# Patient Record
Sex: FEMALE | Race: WHITE | Employment: OTHER | URBAN - METROPOLITAN AREA
[De-identification: names, ages, dates, MRNs, and addresses within clinical notes are randomized per-mention and may not be internally consistent; named-entity substitution may affect disease eponyms.]

---

## 2017-04-17 ENCOUNTER — HOSPITAL ENCOUNTER (OUTPATIENT)
Dept: GENERAL RADIOLOGY | Age: 67
Discharge: HOME | End: 2017-04-17
Attending: INTERNAL MEDICINE | Admitting: INTERNAL MEDICINE

## 2017-04-17 ENCOUNTER — OFFICE VISIT (OUTPATIENT)
Age: 67
End: 2017-04-17

## 2017-04-17 VITALS
BODY MASS INDEX: 21.3 KG/M2 | SYSTOLIC BLOOD PRESSURE: 122 MMHG | HEIGHT: 63 IN | DIASTOLIC BLOOD PRESSURE: 68 MMHG | WEIGHT: 120.2 LBS

## 2017-04-17 DIAGNOSIS — M81.0 OSTEOPOROSIS, POSTMENOPAUSAL: Primary | ICD-10-CM

## 2017-04-17 DIAGNOSIS — E55.9 VITAMIN D DEFICIENCY: ICD-10-CM

## 2017-04-17 DIAGNOSIS — M81.0 OSTEOPOROSIS, POSTMENOPAUSAL: ICD-10-CM

## 2017-04-17 DIAGNOSIS — Z51.81 MEDICATION MONITORING ENCOUNTER: ICD-10-CM

## 2017-04-17 PROCEDURE — 99214 OFFICE O/P EST MOD 30 MIN: CPT | Performed by: INTERNAL MEDICINE

## 2017-04-17 PROCEDURE — 77080 DXA BONE DENSITY AXIAL: CPT | Performed by: INTERNAL MEDICINE

## 2017-04-21 ENCOUNTER — TELEPHONE (OUTPATIENT)
Age: 67
End: 2017-04-21

## 2017-04-24 ENCOUNTER — TELEPHONE (OUTPATIENT)
Dept: ENDOCRINOLOGY | Age: 67
End: 2017-04-24

## 2017-04-25 RX ORDER — ALENDRONATE SODIUM 70 MG/1
70 TABLET ORAL WEEKLY
Qty: 12 TABLET | Refills: 4 | Status: SHIPPED | OUTPATIENT
Start: 2017-04-25 | End: 2018-11-28

## 2017-04-28 ENCOUNTER — PROCEDURE VISIT (OUTPATIENT)
Age: 67
End: 2017-04-28

## 2017-04-28 DIAGNOSIS — M81.0 OSTEOPOROSIS, POSTMENOPAUSAL: Primary | ICD-10-CM

## 2017-08-28 ENCOUNTER — HOSPITAL ENCOUNTER (OUTPATIENT)
Dept: ENDOSCOPY | Age: 67
Discharge: HOME | End: 2017-08-28
Attending: INTERNAL MEDICINE | Admitting: INTERNAL MEDICINE

## 2017-08-28 VITALS
HEIGHT: 63 IN | DIASTOLIC BLOOD PRESSURE: 82 MMHG | BODY MASS INDEX: 21.26 KG/M2 | RESPIRATION RATE: 16 BRPM | WEIGHT: 120 LBS | OXYGEN SATURATION: 99 % | HEART RATE: 90 BPM | TEMPERATURE: 97.1 F | SYSTOLIC BLOOD PRESSURE: 146 MMHG

## 2017-08-28 ASSESSMENT — PAIN - FUNCTIONAL ASSESSMENT: PAIN_FUNCTIONAL_ASSESSMENT: 0-10

## 2018-05-01 ENCOUNTER — OFFICE VISIT (OUTPATIENT)
Age: 68
End: 2018-05-01

## 2018-05-01 ENCOUNTER — HOSPITAL ENCOUNTER (OUTPATIENT)
Dept: GENERAL RADIOLOGY | Age: 68
Discharge: HOME | End: 2018-05-01
Attending: INTERNAL MEDICINE | Admitting: INTERNAL MEDICINE

## 2018-05-01 ENCOUNTER — PROCEDURE VISIT (OUTPATIENT)
Age: 68
End: 2018-05-01

## 2018-05-01 VITALS
WEIGHT: 120 LBS | SYSTOLIC BLOOD PRESSURE: 132 MMHG | DIASTOLIC BLOOD PRESSURE: 76 MMHG | BODY MASS INDEX: 21.26 KG/M2 | HEIGHT: 63 IN

## 2018-05-01 DIAGNOSIS — M81.0 OSTEOPOROSIS, POSTMENOPAUSAL: Primary | ICD-10-CM

## 2018-05-01 DIAGNOSIS — M81.0 OSTEOPOROSIS, POSTMENOPAUSAL: ICD-10-CM

## 2018-05-01 DIAGNOSIS — Z51.81 MEDICATION MONITORING ENCOUNTER: ICD-10-CM

## 2018-05-01 DIAGNOSIS — E55.9 VITAMIN D DEFICIENCY: ICD-10-CM

## 2018-05-01 PROCEDURE — 1123F ACP DISCUSS/DSCN MKR DOCD: CPT | Performed by: INTERNAL MEDICINE

## 2018-05-01 PROCEDURE — 77080 DXA BONE DENSITY AXIAL: CPT | Performed by: INTERNAL MEDICINE

## 2018-05-01 PROCEDURE — 1036F TOBACCO NON-USER: CPT | Performed by: INTERNAL MEDICINE

## 2018-05-01 PROCEDURE — 99214 OFFICE O/P EST MOD 30 MIN: CPT | Performed by: INTERNAL MEDICINE

## 2018-05-01 PROCEDURE — 4040F PNEUMOC VAC/ADMIN/RCVD: CPT | Performed by: INTERNAL MEDICINE

## 2018-05-01 PROCEDURE — 1090F PRES/ABSN URINE INCON ASSESS: CPT | Performed by: INTERNAL MEDICINE

## 2018-05-01 PROCEDURE — G8427 DOCREV CUR MEDS BY ELIG CLIN: HCPCS | Performed by: INTERNAL MEDICINE

## 2018-05-01 PROCEDURE — G8399 PT W/DXA RESULTS DOCUMENT: HCPCS | Performed by: INTERNAL MEDICINE

## 2018-05-01 PROCEDURE — G8420 CALC BMI NORM PARAMETERS: HCPCS | Performed by: INTERNAL MEDICINE

## 2018-05-01 PROCEDURE — 3017F COLORECTAL CA SCREEN DOC REV: CPT | Performed by: INTERNAL MEDICINE

## 2018-05-03 ENCOUNTER — TELEPHONE (OUTPATIENT)
Age: 68
End: 2018-05-03

## 2018-05-04 DIAGNOSIS — M81.0 OSTEOPOROSIS, POSTMENOPAUSAL: Primary | ICD-10-CM

## 2018-05-23 ENCOUNTER — NURSE ONLY (OUTPATIENT)
Age: 68
End: 2018-05-23

## 2018-05-23 DIAGNOSIS — M81.0 OSTEOPOROSIS, POSTMENOPAUSAL: Primary | ICD-10-CM

## 2018-05-23 PROCEDURE — 99999 PR OFFICE/OUTPT VISIT,PROCEDURE ONLY: CPT | Performed by: INTERNAL MEDICINE

## 2018-05-23 PROCEDURE — 96372 THER/PROPH/DIAG INJ SC/IM: CPT | Performed by: INTERNAL MEDICINE

## 2018-09-24 ENCOUNTER — APPOINTMENT (RX ONLY)
Dept: URBAN - METROPOLITAN AREA CLINIC 170 | Facility: CLINIC | Age: 68
Setting detail: DERMATOLOGY
End: 2018-09-24

## 2018-09-24 DIAGNOSIS — D22 MELANOCYTIC NEVI: ICD-10-CM

## 2018-09-24 DIAGNOSIS — L91.8 OTHER HYPERTROPHIC DISORDERS OF THE SKIN: ICD-10-CM

## 2018-09-24 DIAGNOSIS — L82.0 INFLAMED SEBORRHEIC KERATOSIS: ICD-10-CM

## 2018-09-24 DIAGNOSIS — L82.1 OTHER SEBORRHEIC KERATOSIS: ICD-10-CM

## 2018-09-24 DIAGNOSIS — L81.4 OTHER MELANIN HYPERPIGMENTATION: ICD-10-CM

## 2018-09-24 DIAGNOSIS — D18.0 HEMANGIOMA: ICD-10-CM

## 2018-09-24 PROBLEM — L23.7 ALLERGIC CONTACT DERMATITIS DUE TO PLANTS, EXCEPT FOOD: Status: ACTIVE | Noted: 2018-09-24

## 2018-09-24 PROBLEM — D22.5 MELANOCYTIC NEVI OF TRUNK: Status: ACTIVE | Noted: 2018-09-24

## 2018-09-24 PROBLEM — D18.01 HEMANGIOMA OF SKIN AND SUBCUTANEOUS TISSUE: Status: ACTIVE | Noted: 2018-09-24

## 2018-09-24 PROCEDURE — 17110 DESTRUCTION B9 LES UP TO 14: CPT

## 2018-09-24 PROCEDURE — ? BENIGN DESTRUCTION

## 2018-09-24 PROCEDURE — ? COUNSELING

## 2018-09-24 PROCEDURE — ? SUNSCREEN RECOMMENDATIONS

## 2018-09-24 PROCEDURE — 99213 OFFICE O/P EST LOW 20 MIN: CPT | Mod: 25

## 2018-09-24 ASSESSMENT — LOCATION DETAILED DESCRIPTION DERM
LOCATION DETAILED: LEFT RIB CAGE
LOCATION DETAILED: LEFT LATERAL CANTHUS
LOCATION DETAILED: RIGHT ANTERIOR SHOULDER
LOCATION DETAILED: RIGHT INFERIOR MEDIAL MIDBACK
LOCATION DETAILED: INFERIOR THORACIC SPINE
LOCATION DETAILED: EPIGASTRIC SKIN

## 2018-09-24 ASSESSMENT — LOCATION SIMPLE DESCRIPTION DERM
LOCATION SIMPLE: RIGHT SHOULDER
LOCATION SIMPLE: UPPER BACK
LOCATION SIMPLE: ABDOMEN
LOCATION SIMPLE: RIGHT LOWER BACK
LOCATION SIMPLE: LEFT EYELID

## 2018-09-24 ASSESSMENT — LOCATION ZONE DERM
LOCATION ZONE: TRUNK
LOCATION ZONE: ARM
LOCATION ZONE: EYELID

## 2018-09-24 NOTE — PROCEDURE: BENIGN DESTRUCTION
Include Z78.9 (Other Specified Conditions Influencing Health Status) As An Associated Diagnosis?: No
Anesthesia Volume In Cc: 0.5
Post-Care Instructions: I reviewed with the patient in detail post-care instructions. Patient is to wear sunprotection, and avoid picking at any of the treated lesions. Pt may apply Vaseline to crusted or scabbing areas.
Consent: The patient's consent was obtained including but not limited to risks of crusting, scabbing, blistering, scarring, darker or lighter pigmentary change, recurrence, incomplete removal and infection.
Medical Necessity Clause: This procedure was medically necessary because the lesions that were treated were:
Medical Necessity Information: It is in your best interest to select a reason for this procedure from the list below. All of these items fulfill various CMS LCD requirements except the new and changing color options.
Detail Level: Detailed
Treatment Number (Will Not Render If 0): 0

## 2018-09-24 NOTE — PROCEDURE: SUNSCREEN RECOMMENDATIONS
General Sunscreen Counseling: I recommended a broad spectrum sunscreen with a SPF of 50 or higher. Sunscreens should be applied at least 15 minutes prior to expected sun exposure and then every 2 hours after that as long as sun exposure continues. If swimming or exercising, sunscreen should be reapplied every 45 minutes to an hour after getting wet or sweating.  One ounce, or the equivalent of a shot glass full of sunscreen, is adequate to protect the skin not covered by a bathing suit. I also recommended a lip balm with a sunscreen as well. Sun protective clothing can be used in lieu of sunscreen but must be worn the entire time you are exposed to the sun's rays.
Detail Level: Generalized

## 2018-11-27 DIAGNOSIS — M81.0 OSTEOPOROSIS, POSTMENOPAUSAL: Primary | ICD-10-CM

## 2018-11-29 ENCOUNTER — NURSE ONLY (OUTPATIENT)
Dept: ENDOCRINOLOGY | Age: 68
End: 2018-11-29
Payer: MEDICARE

## 2018-11-29 DIAGNOSIS — M81.0 OSTEOPOROSIS, POSTMENOPAUSAL: Primary | ICD-10-CM

## 2018-11-29 PROCEDURE — 96372 THER/PROPH/DIAG INJ SC/IM: CPT | Performed by: INTERNAL MEDICINE

## 2019-05-20 ENCOUNTER — RX ONLY (OUTPATIENT)
Age: 69
Setting detail: RX ONLY
End: 2019-05-20

## 2019-05-20 RX ORDER — TRIAMCINOLONE ACETONIDE 1 MG/G
CREAM TOPICAL
Qty: 1 | Refills: 0 | Status: ERX | COMMUNITY
Start: 2019-05-20

## 2019-06-25 ENCOUNTER — PROCEDURE VISIT (OUTPATIENT)
Dept: ENDOCRINOLOGY | Age: 69
End: 2019-06-25
Payer: MEDICARE

## 2019-06-25 ENCOUNTER — OFFICE VISIT (OUTPATIENT)
Dept: ENDOCRINOLOGY | Age: 69
End: 2019-06-25
Payer: MEDICARE

## 2019-06-25 ENCOUNTER — HOSPITAL ENCOUNTER (OUTPATIENT)
Dept: GENERAL RADIOLOGY | Age: 69
Discharge: HOME | End: 2019-06-25
Payer: MEDICARE

## 2019-06-25 VITALS
DIASTOLIC BLOOD PRESSURE: 68 MMHG | BODY MASS INDEX: 21.44 KG/M2 | HEIGHT: 63 IN | SYSTOLIC BLOOD PRESSURE: 121 MMHG | WEIGHT: 121 LBS

## 2019-06-25 DIAGNOSIS — M81.0 OSTEOPOROSIS, POSTMENOPAUSAL: ICD-10-CM

## 2019-06-25 DIAGNOSIS — Z51.81 MEDICATION MONITORING ENCOUNTER: ICD-10-CM

## 2019-06-25 DIAGNOSIS — E55.9 VITAMIN D DEFICIENCY: ICD-10-CM

## 2019-06-25 DIAGNOSIS — M81.0 OSTEOPOROSIS, POSTMENOPAUSAL: Primary | ICD-10-CM

## 2019-06-25 LAB — VITAMIN D 25-HYDROXY: 34.2 NG/ML

## 2019-06-25 PROCEDURE — 4040F PNEUMOC VAC/ADMIN/RCVD: CPT | Performed by: INTERNAL MEDICINE

## 2019-06-25 PROCEDURE — G8399 PT W/DXA RESULTS DOCUMENT: HCPCS | Performed by: INTERNAL MEDICINE

## 2019-06-25 PROCEDURE — 3017F COLORECTAL CA SCREEN DOC REV: CPT | Performed by: INTERNAL MEDICINE

## 2019-06-25 PROCEDURE — 77080 DXA BONE DENSITY AXIAL: CPT

## 2019-06-25 PROCEDURE — 77080 DXA BONE DENSITY AXIAL: CPT | Performed by: INTERNAL MEDICINE

## 2019-06-25 PROCEDURE — G8420 CALC BMI NORM PARAMETERS: HCPCS | Performed by: INTERNAL MEDICINE

## 2019-06-25 PROCEDURE — 1123F ACP DISCUSS/DSCN MKR DOCD: CPT | Performed by: INTERNAL MEDICINE

## 2019-06-25 PROCEDURE — 1036F TOBACCO NON-USER: CPT | Performed by: INTERNAL MEDICINE

## 2019-06-25 PROCEDURE — 96372 THER/PROPH/DIAG INJ SC/IM: CPT | Performed by: INTERNAL MEDICINE

## 2019-06-25 PROCEDURE — 99214 OFFICE O/P EST MOD 30 MIN: CPT | Performed by: INTERNAL MEDICINE

## 2019-06-25 PROCEDURE — 1090F PRES/ABSN URINE INCON ASSESS: CPT | Performed by: INTERNAL MEDICINE

## 2019-06-25 PROCEDURE — G8428 CUR MEDS NOT DOCUMENT: HCPCS | Performed by: INTERNAL MEDICINE

## 2019-06-25 NOTE — RESULT ENCOUNTER NOTE
[facility]) Osteoporosis and [address] [place] [address]., [address] [place] [address]   Phone [phone]  Fax [phone]    NAME: Chasity Garduno   : 1950   STUDY DATE: 2019     REFERRING PROVIDER: Aurora Caballero     INDICATION(S) FOR PERFORMING THE STUDY:  osteoporosis, age-related (M81.0)    CLINICAL INFORMATION PROVIDED BY THE PATIENT: 48-year-old woman. She started natural menopause at age 53. No history of fragility fractures. No long-term corticosteroid use. She took Fosamax -2013 (stopped for a drug holiday) and again 2017-2018. Current treatment is Prolia started 2018. EQUIPMENT: Hologic Discovery. POSITIONING: Good. REGIONS OF INTEREST: Correct. ARTIFACTS: None. STUDY VALID? Yes. Spine BMD is spuriously high because of generalized degenerative change;  L1 was deleted prior to  and L4 deleted starting in . In  and , the bottom of the total hip box was placed too low. T-scores  Initial study: 2005 L2-L3 -2.9 left fem. neck -2.4   Current study: 2019 L2-L3 -2.3 left fem. neck -2.5     The table below shows bone mineral density (grams/cm2), the appropriate measure for comparing serial scans. An increase or decrease is significant based on precision studies done at our center according to the ISCD protocol. PA spine Proximal Femur (left)   Date L2-L3 Fem. neck Trochanter Total hip   2005 0.742 0.586 0.611 Invalid   2010 0.725 0.648 0.617 Invalid   10/12/2012 0.790 0.580 0.610 0.765   2014 0.758 0.573 0600 0.766   02/10/2015 0.722 0.583 0.597 0.768   03/15/2016 0.742 0.596 0.607 0.777   2017 0.738 0.592 0.628 0.784   2018 0.768 0.554 0.624 0.775   2019 0.800 0.573 0.631 0.793     IMPRESSION:  BONE DENSITY IS LOW, CONSISTENT WITH OSTEOPOROSIS. SINCE THE PREVIOUS DXA, BMD INCREASED IN THE SPINE AND IS TRENDING UP IN THE LEFT HIP.      Consider repeating this study in 1-2 years to assess the patient's progress. _________________________________________________   Alfonzo Mota MD, Director, [facility] ([facility]) Osteoporosis and [address] [facility]

## 2019-12-31 DIAGNOSIS — M81.0 OSTEOPOROSIS, POSTMENOPAUSAL: Primary | ICD-10-CM

## 2020-01-02 ENCOUNTER — NURSE ONLY (OUTPATIENT)
Dept: ENDOCRINOLOGY | Age: 70
End: 2020-01-02
Payer: MEDICARE

## 2020-01-02 PROCEDURE — 96372 THER/PROPH/DIAG INJ SC/IM: CPT | Performed by: INTERNAL MEDICINE

## 2020-01-02 NOTE — PROGRESS NOTES
Prolia injection given to Dr. Jennifer Perez       Patient. Nomi Liu [phone]. Lot #  [id number]             Exp:      04/22     Injection given in Left   Arm. Patient advised to wait in office 20 minutes in case of reaction such as SOB, rash, hives, itching.

## 2020-07-13 ENCOUNTER — HOSPITAL ENCOUNTER (OUTPATIENT)
Dept: GENERAL RADIOLOGY | Age: 70
Discharge: HOME | End: 2020-07-13
Payer: MEDICARE

## 2020-07-13 ENCOUNTER — PROCEDURE VISIT (OUTPATIENT)
Dept: ENDOCRINOLOGY | Age: 70
End: 2020-07-13

## 2020-07-13 ENCOUNTER — OFFICE VISIT (OUTPATIENT)
Dept: ENDOCRINOLOGY | Age: 70
End: 2020-07-13
Payer: MEDICARE

## 2020-07-13 VITALS
WEIGHT: 122.8 LBS | SYSTOLIC BLOOD PRESSURE: 122 MMHG | HEIGHT: 63 IN | BODY MASS INDEX: 21.76 KG/M2 | DIASTOLIC BLOOD PRESSURE: 71 MMHG

## 2020-07-13 PROCEDURE — 1123F ACP DISCUSS/DSCN MKR DOCD: CPT | Performed by: INTERNAL MEDICINE

## 2020-07-13 PROCEDURE — 77080 DXA BONE DENSITY AXIAL: CPT

## 2020-07-13 PROCEDURE — 77080 DXA BONE DENSITY AXIAL: CPT | Performed by: INTERNAL MEDICINE

## 2020-07-13 PROCEDURE — 4040F PNEUMOC VAC/ADMIN/RCVD: CPT | Performed by: INTERNAL MEDICINE

## 2020-07-13 PROCEDURE — G8420 CALC BMI NORM PARAMETERS: HCPCS | Performed by: INTERNAL MEDICINE

## 2020-07-13 PROCEDURE — 1036F TOBACCO NON-USER: CPT | Performed by: INTERNAL MEDICINE

## 2020-07-13 PROCEDURE — 99214 OFFICE O/P EST MOD 30 MIN: CPT | Performed by: INTERNAL MEDICINE

## 2020-07-13 PROCEDURE — G8399 PT W/DXA RESULTS DOCUMENT: HCPCS | Performed by: INTERNAL MEDICINE

## 2020-07-13 PROCEDURE — 96372 THER/PROPH/DIAG INJ SC/IM: CPT | Performed by: INTERNAL MEDICINE

## 2020-07-13 PROCEDURE — 3017F COLORECTAL CA SCREEN DOC REV: CPT | Performed by: INTERNAL MEDICINE

## 2020-07-13 PROCEDURE — G8427 DOCREV CUR MEDS BY ELIG CLIN: HCPCS | Performed by: INTERNAL MEDICINE

## 2020-07-13 PROCEDURE — 1090F PRES/ABSN URINE INCON ASSESS: CPT | Performed by: INTERNAL MEDICINE

## 2020-07-13 NOTE — RESULT ENCOUNTER NOTE
[facility]) Osteoporosis and [address] [place] [address]., [address] [place] [address]   Phone [phone]  Fax [phone]    NAME: Lynette Chaudhry   : 1950   STUDY DATE: 2020     REFERRING PROVIDER: Hesham Baer     INDICATION(S) FOR PERFORMING THE STUDY:  osteoporosis, age-related (M81.0)    CLINICAL INFORMATION PROVIDED BY THE PATIENT: 60-year-old woman. She started natural menopause at age 53. No history of fragility fractures. No long-term corticosteroid use. She took Fosamax -2013 (stopped for a [place]) and again 2017-2018. Current treatment is Prolia started 2018. EQUIPMENT: Hologic Discovery. POSITIONING: Good. REGIONS OF INTEREST: Correct. ARTIFACTS: None. STUDY VALID? Yes. Spine BMD is spuriously high because of generalized degenerative change;  L1 was deleted prior to  and L4 deleted starting in . In  and , the bottom of the total hip box was placed too low. T-scores  Initial study: 2005 L2-L3 -2.9 left fem. neck -2.4   Current study: 2020 L2-L3 -2.7 left fem. neck -2.2     The table below shows bone mineral density (grams/cm2), the appropriate measure for comparing serial scans. An increase or decrease is significant based on precision studies done at our center according to the ISCD protocol. PA spine Proximal Femur (left)   Date L2-L3 Fem. neck Trochanter Total hip   2005 0.742 0.586 0.611 Invalid   2010 0.725 0.648 0.617 Invalid   10/12/2012 0.790 0.580 0.610 0.765   2014 0.758 0.573 0600 0.766   02/10/2015 0.722 0.583 0.597 0.768   03/15/2016 0.742 0.596 0.607 0.777   2017 0.738 0.592 0.628 0.784   2018 0.768 0.554 0.624 0.775   2019 0.800 0.573 0.631 0.793   2020 0.761 0.600 0.647 0.815     IMPRESSION:  BONE DENSITY IS LOW, CONSISTENT WITH OSTEOPOROSIS. SINCE THE PREVIOUS DXA, BMD DID NOT CHANGE SIGNIFICANTLY IN THE LEFT HIP.  IT IS LOWER IN THE SPINE BUT SIMILAR NOW TO 2018. COMPARED WITH 2018, BEFORE STARTING PROLIA, BMD IS HIGHER NOW IN THE FEMORAL NECK AND TOTAL HIP AND TRENDING UP IN THE TROCHANTER. Consider repeating this study in 1-2 years to assess the patient's progress. _________________________________________________   Amber Mota MD, Director, [facility]) Osteoporosis and [address] [place] [address]

## 2020-07-13 NOTE — PROGRESS NOTES
[facility] ([facility]) Osteoporosis and [address] [facility] [address] [place] [address]  Phone [phone]  Fax [phone]    NAME: Raquel Del Rio  : 1950  CONSULT DATE: 2014  MOST RECENT VISIT: 2019  TODAYS DATE: 2020    Labs @ [facility] 2019    PROBLEMS. Osteoporosis by DXA 2005, lowest T-score -2.5 in the spine    Family history, mother with osteoporosis, shattered shoulder, takes Fosamax    No fractures  Vitamin D deficiency, desirable 25-OH D is 30-60 ng/mL    12 ng/mL 2013    36 ng/mL 2015    34 ng/mL 2019  Natural menopause age 53 ()    CURRENT MANAGEMENT FOR OSTEOPOROSIS. Calcium, diet 900 mg/d     300 mg/d Cheese, 300 mg/d Yogurt, 300 mg/d Other   Vitamin D 1000 IU/d  Exercise, yoga daily  Pharmacologic therapy, Prolia 60 mg SQ twice yearly started 2018    PREVIOUS MEDICATIONS FOR OSTEOPOROSIS. Fosamax 70 mg weekly -2013 for a drug holiday  Alendronate 2017-2018, changed to [address] [place] [address],[address]. Echinacea  OTC MEDICATIONS. None    CHIEF COMPLAINT. Here for followup of osteoporosis, vitamin D deficiency, monitoring treatment. No new related signs or symptoms. INTERVAL HISTORY. See problem list for chronic/inactive conditions. She received Prolia without side effects. No falls, near-falls or fractures. She feels well overall. FOR FULL DETAILS OF FAMILY HISTORY, PAST MEDICAL AND SURGICAL HISTORY, SOCIAL HISTORY, AND REVIEW OF SYSTEMS, SEE PATIENT QUESTIONNAIRE OF TODAYS DATE. PHYSICAL EXAMINATION. GENERAL. Well-nourished, well-developed, normally proportioned adult. APPEARANCE. Healthy. MENTAL STATUS. Cheerful and alert. Oriented to time, place, and person. MUSCULOSKELETAL. Spinal contours are normal.  No spine tenderness to palpation or percussion. Three finger spaces between ribs and pelvis. No joint deformity. Gait steady without assistance. NEUROLOGICAL.  Able to rise from chair without using arms. No apparent focal motor or sensory deficit. Coordination appears normal.       BONE DENSITY. Most recent done here using Hologic equipment. T-scores  Initial study: 06/24/2005 L2-L3 -2.9 left fem. neck -2.4   Current study: 07/13/2020 L2-L3 -2.7 left fem. neck -2.2     The table below shows bone mineral density (grams/cm2), the appropriate measure for comparing serial scans. An increase or decrease is significant based on precision studies done at our center according to the ISCD protocol. PA spine Proximal Femur (left)   Date L2-L3 Fem. neck Trochanter Total hip   06/24/2005 0.742 0.586 0.611 Invalid   08/06/2010 0.725 0.648 0.617 Invalid   10/12/2012 0.790 0.580 0.610 0.765   01/28/2014 0.758 0.573 0600 0.766   02/10/2015 0.722 0.583 0.597 0.768   03/15/2016 0.742 0.596 0.607 0.777   04/17/2017 0.738 0.592 0.628 0.784   05/01/2018 0.768 0.554 0.624 0.775   06/25/2019 0.800 0.573 0.631 0.793   07/13/2020 0.761 0.600 0.647 0.815     IMPRESSION:  BONE DENSITY IS LOW, CONSISTENT WITH OSTEOPOROSIS. SINCE THE PREVIOUS DXA, BMD DID NOT CHANGE SIGNIFICANTLY IN THE LEFT HIP. IT IS LOWER IN THE SPINE BUT SIMILAR NOW TO 2018. COMPARED WITH 2018, BEFORE STARTING PROLIA, BMD IS HIGHER NOW IN THE FEMORAL NECK AND TOTAL HIP AND TRENDING UP IN THE TROCHANTER. X-rays viewed: DXA printouts reviewed. Labs.  01/2013, CBC, CMP. 04/2013, UCa 181. 11/2017 Ca 9.8 Cr 0.6. 11/2018 Ca 10.0 Cr 0.6.  12/2019 Ca 9.4 Cr 0.6. ASSESSMENT. Osteoporosis, bone mineral density less than desirable, though stable [id number] with treatment with alendronate and again 04/2017-05/2018. She is doing well with Prolia started 05/2018. PLANS. OK to give Prolia today and continue Prolia 60 mg SQ twice yearly. Lab: 25-OH D. Return with DXA in one year    I spent 25 minutes face to face with this patient. Over 50% of that time was spent on counseling and care coordination.  See assessment and plan for counseling and care coordination details. Reggie Mota MD, Director, [facility] ([facility]) Osteoporosis and Bone Health Services    CC: Jose Officer MD Kennedy Aldrich MD

## 2021-01-14 ENCOUNTER — NURSE ONLY (OUTPATIENT)
Dept: ENDOCRINOLOGY | Age: 71
End: 2021-01-14
Payer: MEDICARE

## 2021-01-14 DIAGNOSIS — M81.0 OSTEOPOROSIS, POSTMENOPAUSAL: ICD-10-CM

## 2021-01-14 PROCEDURE — 96372 THER/PROPH/DIAG INJ SC/IM: CPT | Performed by: INTERNAL MEDICINE

## 2021-07-27 ENCOUNTER — OFFICE VISIT (OUTPATIENT)
Dept: ENDOCRINOLOGY | Age: 71
End: 2021-07-27
Payer: MEDICARE

## 2021-07-27 VITALS
WEIGHT: 124.2 LBS | HEIGHT: 63 IN | BODY MASS INDEX: 22.01 KG/M2 | DIASTOLIC BLOOD PRESSURE: 69 MMHG | SYSTOLIC BLOOD PRESSURE: 116 MMHG

## 2021-07-27 DIAGNOSIS — E55.9 VITAMIN D DEFICIENCY: ICD-10-CM

## 2021-07-27 DIAGNOSIS — Z51.81 MEDICATION MONITORING ENCOUNTER: ICD-10-CM

## 2021-07-27 DIAGNOSIS — M81.0 OSTEOPOROSIS, POSTMENOPAUSAL: Primary | ICD-10-CM

## 2021-07-27 PROCEDURE — G8399 PT W/DXA RESULTS DOCUMENT: HCPCS | Performed by: INTERNAL MEDICINE

## 2021-07-27 PROCEDURE — 96372 THER/PROPH/DIAG INJ SC/IM: CPT | Performed by: INTERNAL MEDICINE

## 2021-07-27 PROCEDURE — 4040F PNEUMOC VAC/ADMIN/RCVD: CPT | Performed by: INTERNAL MEDICINE

## 2021-07-27 PROCEDURE — 1090F PRES/ABSN URINE INCON ASSESS: CPT | Performed by: INTERNAL MEDICINE

## 2021-07-27 PROCEDURE — 1036F TOBACCO NON-USER: CPT | Performed by: INTERNAL MEDICINE

## 2021-07-27 PROCEDURE — 99214 OFFICE O/P EST MOD 30 MIN: CPT | Performed by: INTERNAL MEDICINE

## 2021-07-27 PROCEDURE — 1123F ACP DISCUSS/DSCN MKR DOCD: CPT | Performed by: INTERNAL MEDICINE

## 2021-07-27 PROCEDURE — G8420 CALC BMI NORM PARAMETERS: HCPCS | Performed by: INTERNAL MEDICINE

## 2021-07-27 PROCEDURE — 3017F COLORECTAL CA SCREEN DOC REV: CPT | Performed by: INTERNAL MEDICINE

## 2021-07-27 PROCEDURE — G8427 DOCREV CUR MEDS BY ELIG CLIN: HCPCS | Performed by: INTERNAL MEDICINE

## 2021-07-27 NOTE — PROGRESS NOTES
[facility] ([facility]) Osteoporosis and [address] [facility] [address] [place] [address]  Phone [phone]  Fax [phone]    NAME: Kerry Arellano  : 1950  CONSULT DATE: 2014  MOST RECENT VISIT: 2020  TODAYS DATE: 2021    Labs @ [facility] 2020    PROBLEMS. Osteoporosis by DXA 2005, lowest T-score -2.5 in the spine    Family history, mother with osteoporosis, shattered shoulder, takes Fosamax    No fractures  Vitamin D deficiency, desirable 25-OH D is 30-60 ng/mL    12 ng/mL 2013    36 ng/mL 2015    34 ng/mL 2019  Natural menopause age 53 ()    CURRENT MANAGEMENT FOR OSTEOPOROSIS. Calcium, diet 900 mg/d     300 mg/d Cheese, 300 mg/d Yogurt, 300 mg/d Other   Vitamin D 1000 IU/d  Exercise, yoga daily  Pharmacologic therapy, Prolia 60 mg SQ twice yearly started 2018    PREVIOUS MEDICATIONS FOR OSTEOPOROSIS. Fosamax 70 mg weekly -2013 for a drug holiday  Alendronate 2017-2018, changed to [address] [place] [address],[address]. Echinacea  OTC MEDICATIONS. None    CHIEF COMPLAINT. Here for followup of osteoporosis, vitamin D deficiency, monitoring treatment. No new related signs or symptoms. INTERVAL HISTORY. See problem list for chronic/inactive conditions. She received Prolia without side effects. No falls, near-falls or fractures. She feels well overall. FOR FULL DETAILS OF FAMILY HISTORY, PAST MEDICAL AND SURGICAL HISTORY, SOCIAL HISTORY, AND REVIEW OF SYSTEMS, SEE PATIENT QUESTIONNAIRE OF  DATE. PHYSICAL EXAMINATION. GENERAL. Well-nourished, well-developed, normally proportioned adult. APPEARANCE. Healthy. MENTAL STATUS. Cheerful and alert. Oriented to time, place, and person. MUSCULOSKELETAL. Spinal contours are normal.  No spine tenderness to palpation or percussion. Three finger spaces between ribs and pelvis. No joint deformity. Gait steady without assistance. NEUROLOGICAL.  Able to rise from year. Time spent today: 30-40 minutes. Danilo Mota MD, Director, [facility] ([facility]) Osteoporosis and Bone Health Services    CC: Aliza Erwin MD

## 2021-08-19 ENCOUNTER — HOSPITAL ENCOUNTER (OUTPATIENT)
Dept: GENERAL RADIOLOGY | Age: 71
Discharge: HOME | End: 2021-08-19
Payer: MEDICARE

## 2021-08-19 ENCOUNTER — PROCEDURE VISIT (OUTPATIENT)
Dept: ENDOCRINOLOGY | Age: 71
End: 2021-08-19

## 2021-08-19 DIAGNOSIS — M81.0 OSTEOPOROSIS, POSTMENOPAUSAL: ICD-10-CM

## 2021-08-19 PROCEDURE — 77080 DXA BONE DENSITY AXIAL: CPT | Performed by: INTERNAL MEDICINE

## 2021-08-19 PROCEDURE — 77080 DXA BONE DENSITY AXIAL: CPT

## 2021-08-20 NOTE — RESULT ENCOUNTER NOTE
patient's progress. _________________________________________________   Destin Mota MD, Director, [facility] ([facility]) Osteoporosis and [address] [facility]

## 2021-10-20 ENCOUNTER — TELEPHONE (OUTPATIENT)
Dept: HEMATOLOGY | Age: 71
End: 2021-10-20

## 2021-10-20 NOTE — TELEPHONE ENCOUNTER
Oleg has talked to Ms. Lacy this morning we have recommended her to go to Dr. Joelle Henry due to mass on femur and complications that it could result in the future. She has declined her referral to  claims she wants to start radiation before being referred to Dr. Carina Murillo.

## 2022-02-02 ENCOUNTER — NURSE ONLY (OUTPATIENT)
Dept: ENDOCRINOLOGY | Age: 72
End: 2022-02-02
Payer: MEDICARE

## 2022-02-02 DIAGNOSIS — M81.0 OSTEOPOROSIS, POSTMENOPAUSAL: ICD-10-CM

## 2022-02-02 PROCEDURE — 96372 THER/PROPH/DIAG INJ SC/IM: CPT | Performed by: INTERNAL MEDICINE

## 2022-08-01 ENCOUNTER — HOSPITAL ENCOUNTER (OUTPATIENT)
Dept: GENERAL RADIOLOGY | Age: 72
Discharge: HOME | End: 2022-08-01
Payer: MEDICARE

## 2022-08-01 ENCOUNTER — OFFICE VISIT (OUTPATIENT)
Dept: ENDOCRINOLOGY | Age: 72
End: 2022-08-01
Payer: MEDICARE

## 2022-08-01 ENCOUNTER — PROCEDURE VISIT (OUTPATIENT)
Dept: ENDOCRINOLOGY | Age: 72
End: 2022-08-01

## 2022-08-01 VITALS
HEIGHT: 63 IN | WEIGHT: 124 LBS | SYSTOLIC BLOOD PRESSURE: 130 MMHG | DIASTOLIC BLOOD PRESSURE: 71 MMHG | BODY MASS INDEX: 21.97 KG/M2

## 2022-08-01 DIAGNOSIS — M81.0 OSTEOPOROSIS, POSTMENOPAUSAL: Primary | ICD-10-CM

## 2022-08-01 DIAGNOSIS — Z51.81 MEDICATION MONITORING ENCOUNTER: ICD-10-CM

## 2022-08-01 DIAGNOSIS — E55.9 VITAMIN D DEFICIENCY: ICD-10-CM

## 2022-08-01 DIAGNOSIS — M81.0 OSTEOPOROSIS, POSTMENOPAUSAL: ICD-10-CM

## 2022-08-01 PROCEDURE — 1123F ACP DISCUSS/DSCN MKR DOCD: CPT | Performed by: INTERNAL MEDICINE

## 2022-08-01 PROCEDURE — 77080 DXA BONE DENSITY AXIAL: CPT | Performed by: INTERNAL MEDICINE

## 2022-08-01 PROCEDURE — 99214 OFFICE O/P EST MOD 30 MIN: CPT | Performed by: INTERNAL MEDICINE

## 2022-08-01 PROCEDURE — 77080 DXA BONE DENSITY AXIAL: CPT

## 2022-08-01 NOTE — PROGRESS NOTES
[facility] ([facility]) Osteoporosis and [address] [place] [address]  Phone [phone]  Fax [phone]    NAME: Bin Manriquez  : 1950  CONSULT DATE: 2014  MOST RECENT VISIT: 2021  TODAY'S DATE: 2022    Labs @ [facility] 2021    PROBLEMS. Osteoporosis by DXA 2005, lowest T-score -2.5 in the spine    Family history, mother with osteoporosis, shattered shoulder, takes Fosamax    No fractures  Vitamin D deficiency, desirable 25-OH D is 30-60 ng/mL    12 ng/mL 2013    36 ng/mL 2015    34 ng/mL 2019  Natural menopause age 53 ()    CURRENT MANAGEMENT FOR OSTEOPOROSIS. Calcium, diet 900 mg/d     300 mg/d Cheese, 300 mg/d Yogurt, 300 mg/d Other   Vitamin D 1000 IU/d  Exercise, yoga daily  Pharmacologic therapy, Prolia 60 mg SQ twice yearly started 2018    PREVIOUS MEDICATIONS FOR OSTEOPOROSIS. Fosamax 70 mg weekly -2013 for a holiday  Alendronate 2017-2018, changed to [address] [place] [address],[address]. Echinacea  OTC MEDICATIONS. None    CHIEF COMPLAINT. Here for followup of osteoporosis, vitamin D deficiency, monitoring treatment. No new related signs or symptoms. INTERVAL HISTORY. See problem list for chronic/inactive conditions. She received Prolia without side effects. No falls, near-falls or fractures. She feels well overall. FOR FULL DETAILS OF FAMILY HISTORY, PAST MEDICAL AND SURGICAL HISTORY, SOCIAL HISTORY, AND REVIEW OF SYSTEMS, SEE PATIENT QUESTIONNAIRE OF TODAY'S DATE. PHYSICAL EXAMINATION. GENERAL. Well-nourished, well-developed, normally proportioned adult. APPEARANCE. Healthy. MENTAL STATUS. Cheerful and alert. Oriented to time, place, and person. MUSCULOSKELETAL. Spinal contours are normal.  No spine tenderness to palpation or percussion. Three finger spaces between ribs and pelvis. No joint deformity. Gait steady without assistance. NEUROLOGICAL.  Able to rise from chair without using arms. No apparent focal motor or sensory deficit. Coordination appears normal.         BONE DENSITY. Most recent done here using Hologic equipment. T-scores  Initial study: 06/24/2005 L2-L3 -2.9 left fem. neck -2.4   Current study: 08/01/2022 L2-L3 -2.4 left fem. neck -1.9     The table below shows bone mineral density (grams/cm2), the appropriate measure for comparing serial scans. An increase or decrease is significant based on precision studies done at our center according to the ISCD protocol. PA spine Proximal Femur (left)   Date L2-L3 Fem. neck Trochanter Total hip   06/24/2005 0.742 0.586 0.611 Too low   04/17/2017 0.738 0.592 0.628 0.784   05/01/2018 0.768 0.554 0.624 0.775   06/25/2019 0.573 0.631 0.793   07/13/2020 0.761 0.600 0.647 0.815   08/19/2021 0.644 0.616 0.847   08/01/2022 0.789 0.640 0.679 0.847     IMPRESSION:  BONE DENSITY IS LOW, CONSISTENT WITH OSTEOPOROSIS. SINCE THE LAST DXA, BMD DID NOT CHANGE SIGNIFICANTLY IN THE SPINE OR LEFT HIP.  COMPARED WITH 2018, BEFORE STARTING PROLIA, BMD IS HIGHER NOW IN THE FEMORAL NECK, TROCHANTER AND TOTAL HIP. X-rays viewed: DXA printouts reviewed. Labs.  01/2013 CBC CMP. 04/2013 UCa 181. 11/2017 Ca 9.8 Cr 0.6. 11/2018 Ca 10.0 Cr 0.6.  12/2019 Ca 9.4 Cr 0.6. 12/2020 Ca 9.4 Cr 0.7. 12/2021 Ca 9.1 Cr 0.6. ASSESSMENT. Osteoporosis, bone mineral density less than desirable, though stable [id number] with treatment with alendronate and again 04/2017-05/2018. She is doing well with Prolia started 05/2018. PLANS. Continue Prolia 60 mg SQ twice yearly (next dose scheduled 08/10/2022). Return with DXA in one year. Time spent today: 30-39 minutes. Omi Mota MD, Director, [facility]) Osteoporosis and Bone Health Services    CC: Ashly Molina MD

## 2022-08-01 NOTE — RESULT ENCOUNTER NOTE
[facility]) Osteoporosis and [address], [address] [place] [address],[place] [address]  Phone [phone]  Fax [phone]    NAME: Moira Nix   : 1950   STUDY DATE: 2022     REFERRING PROVIDER: Talha Dailey     INDICATION(S) FOR PERFORMING THE STUDY:  osteoporosis, age-related (M81.0)    CLINICAL INFORMATION PROVIDED BY THE PATIENT: 18-year-old woman. She started natural menopause at age 53. No history of fragility fractures. No long-term corticosteroid use. She took Fosamax -2013 (stopped for a [place]) and again 2017-2018. Current treatment is Prolia started 2018. EQUIPMENT: Hologic Discovery. POSITIONING: Good. REGIONS OF INTEREST: Correct. ARTIFACTS: None. STUDY VALID? Yes. Spine BMD is spuriously high because of generalized degenerative change;  L1 was deleted prior to  and L4 deleted starting in . T-scores  Initial study: 2005 L2-L3 -2.9 left fem. neck -2.4   Current study: 2022 L2-L3 -2.4 left fem. neck -1.9     The table below shows bone mineral density (grams/cm2), the appropriate measure for comparing serial scans. An increase or decrease is significant based on precision studies done at our center according to the ISCD protocol. PA spine Proximal Femur (left)   Date L2-L3 Fem. neck Trochanter Total hip   2005 0.742 0.586 0.611 Too low   2017 0.738 0.592 0.628 0.784   2018 0.768 0.554 0.624 0.775   2019 0.573 0.631 0.793   2020 0.761 0.600 0.647 0.815   2021 0.644 0.616 0.847   2022 0.789 0.640 0.679 0.847     IMPRESSION:  BONE DENSITY IS LOW, CONSISTENT WITH OSTEOPOROSIS. SINCE THE LAST DXA, BMD DID NOT CHANGE SIGNIFICANTLY IN THE SPINE OR LEFT HIP.  COMPARED WITH 2018, BEFORE STARTING PROLIA, BMD IS HIGHER NOW IN THE FEMORAL NECK, TROCHANTER AND TOTAL HIP.      Consider repeating this study in 1-2 years to assess the patient's progress. _________________________________________________   Noni Mota MD, Director, [facility] ([facility]) Osteoporosis and [address] [facility] [place]

## 2022-08-10 ENCOUNTER — NURSE ONLY (OUTPATIENT)
Dept: ENDOCRINOLOGY | Age: 72
End: 2022-08-10
Payer: MEDICARE

## 2022-08-10 DIAGNOSIS — M81.0 OSTEOPOROSIS, POSTMENOPAUSAL: Primary | ICD-10-CM

## 2022-08-10 PROCEDURE — 96372 THER/PROPH/DIAG INJ SC/IM: CPT | Performed by: INTERNAL MEDICINE

## 2022-09-19 ENCOUNTER — HOSPITAL ENCOUNTER (OUTPATIENT)
Age: 72
Setting detail: OUTPATIENT SURGERY
Discharge: HOME | End: 2022-09-19
Attending: INTERNAL MEDICINE | Admitting: INTERNAL MEDICINE
Payer: MEDICARE

## 2022-09-19 VITALS
HEIGHT: 63 IN | DIASTOLIC BLOOD PRESSURE: 73 MMHG | SYSTOLIC BLOOD PRESSURE: 138 MMHG | WEIGHT: 120 LBS | RESPIRATION RATE: 16 BRPM | OXYGEN SATURATION: 94 % | TEMPERATURE: 96.9 F | HEART RATE: 85 BPM | BODY MASS INDEX: 21.26 KG/M2

## 2022-09-19 PROCEDURE — 3609027000 HC COLONOSCOPY: Performed by: INTERNAL MEDICINE

## 2022-09-19 PROCEDURE — 2580000003 HC RX 258: Performed by: INTERNAL MEDICINE

## 2022-09-19 PROCEDURE — 2709999900 HC NON-CHARGEABLE SUPPLY: Performed by: INTERNAL MEDICINE

## 2022-09-19 PROCEDURE — 6360000002 HC RX W HCPCS: Performed by: INTERNAL MEDICINE

## 2022-09-19 PROCEDURE — 99152 MOD SED SAME PHYS/QHP 5/>YRS: CPT | Performed by: INTERNAL MEDICINE

## 2022-09-19 PROCEDURE — 7100000011 HC PHASE II RECOVERY - ADDTL 15 MIN: Performed by: INTERNAL MEDICINE

## 2022-09-19 PROCEDURE — 7100000010 HC PHASE II RECOVERY - FIRST 15 MIN: Performed by: INTERNAL MEDICINE

## 2022-09-19 RX ORDER — MIDAZOLAM HYDROCHLORIDE 1 MG/ML
INJECTION INTRAMUSCULAR; INTRAVENOUS PRN
Status: DISCONTINUED | OUTPATIENT
Start: 2022-09-19 | End: 2022-09-19 | Stop reason: ALTCHOICE

## 2022-09-19 RX ORDER — FENTANYL CITRATE 50 UG/ML
INJECTION, SOLUTION INTRAMUSCULAR; INTRAVENOUS PRN
Status: DISCONTINUED | OUTPATIENT
Start: 2022-09-19 | End: 2022-09-19 | Stop reason: ALTCHOICE

## 2022-09-19 RX ORDER — SODIUM CHLORIDE, SODIUM LACTATE, POTASSIUM CHLORIDE, CALCIUM CHLORIDE 600; 310; 30; 20 MG/100ML; MG/100ML; MG/100ML; MG/100ML
INJECTION, SOLUTION INTRAVENOUS CONTINUOUS
Status: DISCONTINUED | OUTPATIENT
Start: 2022-09-19 | End: 2022-09-19 | Stop reason: HOSPADM

## 2022-09-19 RX ADMIN — SODIUM CHLORIDE, POTASSIUM CHLORIDE, SODIUM LACTATE AND CALCIUM CHLORIDE: 600; 310; 30; 20 INJECTION, SOLUTION INTRAVENOUS at 09:19

## 2022-09-19 ASSESSMENT — PAIN SCALES - GENERAL
PAINLEVEL_OUTOF10: 0

## 2022-09-19 ASSESSMENT — PAIN - FUNCTIONAL ASSESSMENT
PAIN_FUNCTIONAL_ASSESSMENT: WONG-BAKER FACES
PAIN_FUNCTIONAL_ASSESSMENT: WONG-BAKER FACES
PAIN_FUNCTIONAL_ASSESSMENT: 0-10
PAIN_FUNCTIONAL_ASSESSMENT: WONG-BAKER FACES

## 2022-09-19 NOTE — PROGRESS NOTES
Patient alert and oriented to baseline, vitals are stable, wheeled out to caregiver who will drive them home.

## 2022-09-19 NOTE — H&P
History and Physical / Pre-Sedation Assessment    Gracia Dillard is a 66 y.o. female who presents today for colonoscopy procedure. PMHx:    Past Medical History:   Diagnosis Date    Chondromalacia of knee     Osteoporosis        Medications:    Prior to Admission medications    Medication Sig Start Date End Date Taking? Authorizing Provider   denosumab (PROLIA) 60 MG/ML SOLN SC injection Inject 1 mL into the skin once for 1 dose  Patient taking differently: Inject 60 mg into the skin once Every 6 months 4/17/17 9/19/22  Jaqueline Bernheim, MD   vitamin D (CHOLECALCIFEROL) 1000 UNIT TABS tablet Take 1,000 Units by mouth daily. Historical Provider, MD       Allergies: Allergies   Allergen Reactions    Codeine Nausea And Vomiting       PSHx:    Past Surgical History:   Procedure Laterality Date    COLONOSCOPY      KNEE ARTHROSCOPY Left     OVARY REMOVAL Left        Social Hx:    Social History     Socioeconomic History    Marital status:      Spouse name: Not on file    Number of children: Not on file    Years of education: Not on file    Highest education level: Not on file   Occupational History    Not on file   Tobacco Use    Smoking status: Never    Smokeless tobacco: Never   Substance and Sexual Activity    Alcohol use: Yes     Comment: occasional glass of wine    Drug use: No    Sexual activity: Not on file   Other Topics Concern    Not on file   Social History Narrative    Not on file     Social Determinants of Health     Financial Resource Strain: Not on file   Food Insecurity: Not on file   Transportation Needs: Not on file   Physical Activity: Not on file   Stress: Not on file   Social Connections: Not on file   Intimate Partner Violence: Not on file   Housing Stability: Not on file       Family Hx: History reviewed. No pertinent family history.     Physical Exam:  Vital Signs: BP (!) 151/90   Pulse 98   Temp 97.6 °F (36.4 °C) (Temporal)   Resp 16   Ht 5' 2.5\" (1.588 m)   Wt 120 lb (54.4 kg) SpO2 96%   BMI 21.60 kg/m²    Pulmonary: Normal  Cardiac: Normal  Abdomen: Normal    Pre-Procedure Assessment / Plan:  ASA Classification: Class 2 - A normal healthy patient with mild systemic disease  Level of Sedation Plan: Moderate sedation   Mallampati Score: II (soft palate, uvula, fauces visible)  Post Procedure plan: Return to same level of care    Colonoscopy Interval History:  3 or more years since last colonoscopy, Less than 3 years since the patient's last colonoscopy due to medical reasons, and Less than 3 years since the patient's last colonoscopy due to system reasons    Medical Reason for Colonoscopy before 3 years last colonoscopy incomplete, last colonoscopy had inadequate prep, piecemeal removal of adenomas, and last colonoscopy found greater than 10 adenomas  System Reasons for Colonoscopy before 3 years:   previous colonoscopy report unavailable or unable to locate    I assessed the patient and find that the patient is in satisfactory condition to proceed with the planned procedure and sedation plan. Risks/benefits/alternatives of procedure discussed with patient and any present family members. Risks including, but not limited to: bleeding, perforation, post polypectomy syndrome, splenic injury, need for additional procedures or surgery, risks of anesthesia. Patient understands it is their responsibility to call office for pathology results if they do not hear from my office within 1-2 weeks. All questions answered.     Jona Renteria MD  9/19/2022

## 2022-09-19 NOTE — DISCHARGE INSTRUCTIONS
ENDOSCOPY DISCHARGE INSTRUCTIONS:    Call the physician that did your procedure for any questions or concerns:           DR. Lesa Alonso:  [phone]               ACTIVITY:    There are potential side effects to the medications used for sedation and anesthesia during your procedure. These include:  Dizziness or light-headedness, confusion or memory loss, delayed reaction times, loss of coordination, nausea and vomiting. Because of your increased risk for injury, we ask that you observe the following precautions: For the next 24 hours,  DO NOT operate an automobile, bicycle, motorcycle, , power tools or large equipment of any kind. Do not drink alcohol, sign any legal documents or make any legal decisions for 24 hours. Do not bend your head over lower than your heart. DO sit on the side of bed/couch awhile before getting up. Plan on bedrest or quiet relaxation today. You may resume normal activities in 24 hours. DIET:    Your first meal today should be light, avoiding spicy and fatty foods. If you tolerate this first meal,  then you may advance to your regular diet unless otherwise advised by your physician. NORMAL SYMPTOMS:  -Sore throat if youve had an EGD  -Gaseous discomfort    NOTIFY YOUR PHYSICIAN IF THESE SYMPTOMS OCCUR:  1. Fever (greater than 100)  5. Increased abdominal bloating  2. Severe pain    6. Excessive bleeding  3. Nausea and vomiting  7. Chest pain                                                                    4. Chills    8. Shortness of breath      ADDITIONAL INSTRUCTIONS:    Biopsy results:  WILL CALL YOU IN 1 WEEK WITH BIOPSY RESULTS. Educational Information:    Follow up appointment:                               Please review these discharge instructions this evening or tomorrow for   information you may have forgotten. We want to thank you for choosing the [facility] as your health care provider.  We always strive to provide you with excellent care while you are here. You may receive a survey in the mail regarding your care. We would appreciate you taking a few minutes of your time to complete this survey.  Again, thank you for choosing the [facility]

## 2023-03-01 ENCOUNTER — NURSE ONLY (OUTPATIENT)
Dept: ENDOCRINOLOGY | Age: 73
End: 2023-03-01
Payer: MEDICARE

## 2023-03-01 DIAGNOSIS — M81.0 OSTEOPOROSIS, POSTMENOPAUSAL: Primary | ICD-10-CM

## 2023-03-01 PROCEDURE — 96372 THER/PROPH/DIAG INJ SC/IM: CPT | Performed by: INTERNAL MEDICINE

## 2023-09-19 ENCOUNTER — HOSPITAL ENCOUNTER (OUTPATIENT)
Dept: GENERAL RADIOLOGY | Age: 73
Discharge: HOME | End: 2023-09-19
Payer: MEDICARE

## 2023-09-19 ENCOUNTER — OFFICE VISIT (OUTPATIENT)
Dept: ENDOCRINOLOGY | Age: 73
End: 2023-09-19
Payer: MEDICARE

## 2023-09-19 VITALS
RESPIRATION RATE: 14 BRPM | DIASTOLIC BLOOD PRESSURE: 75 MMHG | HEART RATE: 78 BPM | BODY MASS INDEX: 21.97 KG/M2 | SYSTOLIC BLOOD PRESSURE: 123 MMHG | HEIGHT: 63 IN | TEMPERATURE: 98 F | WEIGHT: 124 LBS

## 2023-09-19 DIAGNOSIS — E55.9 VITAMIN D DEFICIENCY: ICD-10-CM

## 2023-09-19 DIAGNOSIS — M81.0 OSTEOPOROSIS, POSTMENOPAUSAL: Primary | ICD-10-CM

## 2023-09-19 DIAGNOSIS — Z51.81 MEDICATION MONITORING ENCOUNTER: ICD-10-CM

## 2023-09-19 DIAGNOSIS — M81.0 OSTEOPOROSIS, POSTMENOPAUSAL: ICD-10-CM

## 2023-09-19 PROCEDURE — 3017F COLORECTAL CA SCREEN DOC REV: CPT | Performed by: INTERNAL MEDICINE

## 2023-09-19 PROCEDURE — 77080 DXA BONE DENSITY AXIAL: CPT

## 2023-09-19 PROCEDURE — G8420 CALC BMI NORM PARAMETERS: HCPCS | Performed by: INTERNAL MEDICINE

## 2023-09-19 PROCEDURE — 1036F TOBACCO NON-USER: CPT | Performed by: INTERNAL MEDICINE

## 2023-09-19 PROCEDURE — 1123F ACP DISCUSS/DSCN MKR DOCD: CPT | Performed by: INTERNAL MEDICINE

## 2023-09-19 PROCEDURE — 1090F PRES/ABSN URINE INCON ASSESS: CPT | Performed by: INTERNAL MEDICINE

## 2023-09-19 PROCEDURE — G8399 PT W/DXA RESULTS DOCUMENT: HCPCS | Performed by: INTERNAL MEDICINE

## 2023-09-19 PROCEDURE — 96372 THER/PROPH/DIAG INJ SC/IM: CPT | Performed by: INTERNAL MEDICINE

## 2023-09-19 PROCEDURE — 99214 OFFICE O/P EST MOD 30 MIN: CPT | Performed by: INTERNAL MEDICINE

## 2023-09-19 PROCEDURE — G8427 DOCREV CUR MEDS BY ELIG CLIN: HCPCS | Performed by: INTERNAL MEDICINE

## 2023-09-19 RX ORDER — HYDROCHLOROTHIAZIDE 12.5 MG/1
12.5 TABLET ORAL DAILY
COMMUNITY
Start: 2023-07-11

## 2024-03-21 ENCOUNTER — NURSE ONLY (OUTPATIENT)
Dept: ENDOCRINOLOGY | Age: 74
End: 2024-03-21
Payer: MEDICARE

## 2024-03-21 DIAGNOSIS — M81.0 OSTEOPOROSIS, POSTMENOPAUSAL: Primary | ICD-10-CM

## 2024-03-21 PROCEDURE — 96372 THER/PROPH/DIAG INJ SC/IM: CPT | Performed by: INTERNAL MEDICINE

## 2024-09-18 ENCOUNTER — TELEPHONE (OUTPATIENT)
Dept: ENDOCRINOLOGY | Age: 74
End: 2024-09-18

## 2024-10-01 ENCOUNTER — OFFICE VISIT (OUTPATIENT)
Dept: ENDOCRINOLOGY | Age: 74
End: 2024-10-01
Payer: MEDICARE

## 2024-10-01 ENCOUNTER — HOSPITAL ENCOUNTER (OUTPATIENT)
Dept: GENERAL RADIOLOGY | Age: 74
Discharge: HOME | End: 2024-10-01
Payer: MEDICARE

## 2024-10-01 VITALS — HEIGHT: 63 IN | WEIGHT: 124.2 LBS | BODY MASS INDEX: 22.01 KG/M2

## 2024-10-01 DIAGNOSIS — M81.0 OSTEOPOROSIS, POSTMENOPAUSAL: Primary | ICD-10-CM

## 2024-10-01 DIAGNOSIS — M81.0 OSTEOPOROSIS, POSTMENOPAUSAL: ICD-10-CM

## 2024-10-01 DIAGNOSIS — E55.9 VITAMIN D DEFICIENCY: ICD-10-CM

## 2024-10-01 DIAGNOSIS — Z51.81 MEDICATION MONITORING ENCOUNTER: ICD-10-CM

## 2024-10-01 PROCEDURE — 1090F PRES/ABSN URINE INCON ASSESS: CPT | Performed by: INTERNAL MEDICINE

## 2024-10-01 PROCEDURE — 96372 THER/PROPH/DIAG INJ SC/IM: CPT | Performed by: INTERNAL MEDICINE

## 2024-10-01 PROCEDURE — 99214 OFFICE O/P EST MOD 30 MIN: CPT | Performed by: INTERNAL MEDICINE

## 2024-10-01 PROCEDURE — G8420 CALC BMI NORM PARAMETERS: HCPCS | Performed by: INTERNAL MEDICINE

## 2024-10-01 PROCEDURE — 1123F ACP DISCUSS/DSCN MKR DOCD: CPT | Performed by: INTERNAL MEDICINE

## 2024-10-01 PROCEDURE — 77080 DXA BONE DENSITY AXIAL: CPT | Performed by: INTERNAL MEDICINE

## 2024-10-01 PROCEDURE — G8427 DOCREV CUR MEDS BY ELIG CLIN: HCPCS | Performed by: INTERNAL MEDICINE

## 2024-10-01 PROCEDURE — 3017F COLORECTAL CA SCREEN DOC REV: CPT | Performed by: INTERNAL MEDICINE

## 2024-10-01 PROCEDURE — 1036F TOBACCO NON-USER: CPT | Performed by: INTERNAL MEDICINE

## 2024-10-01 PROCEDURE — G8399 PT W/DXA RESULTS DOCUMENT: HCPCS | Performed by: INTERNAL MEDICINE

## 2024-10-01 PROCEDURE — 77080 DXA BONE DENSITY AXIAL: CPT

## 2024-10-01 PROCEDURE — G8484 FLU IMMUNIZE NO ADMIN: HCPCS | Performed by: INTERNAL MEDICINE

## 2024-10-01 RX ORDER — LORATADINE 10 MG/1
10 CAPSULE, LIQUID FILLED ORAL DAILY
COMMUNITY

## 2024-10-01 RX ORDER — TRIAMCINOLONE ACETONIDE 1 MG/G
CREAM TOPICAL
COMMUNITY
Start: 2024-02-08

## 2024-10-01 NOTE — PROGRESS NOTES
Informed patient if any signs of redness,rash,swelling or unusual symptoms occur, please contact the office. Prolia given per physician order.  
without using arms. No apparent focal motor or sensory deficit. Coordination appears normal.         BONE DENSITY.  Most recent done here using Hologic equipment.    T-scores  Initial study: 06/24/2005 L2-L3 -2.9 left fem. neck -2.4   Current study: 10/01/2024 L2-L3 -2.2 left fem. neck -2.1     The table below shows bone mineral density (grams/cm2), the appropriate measure for comparing serial scans. An “increase” or “decrease” is significant based on precision studies done at our center according to the IS[facility] protocol.      PA spine Proximal Femur (left)   Date L2-L3 Fem. neck Trochanter Total hip   06/24/2005 0.742 0.586 0.611 Too low   05/01/2018 0.768 0.554 0.624 0.775   06/25/2019 0.573 0.631 0.793   07/13/2020 0.761 0.600 0.647 0.815   08/01/2022 0.789 0.640 0.679 0.847   09/19/2023 0.828 0.628 0.814   10/01/2024 0.815 0.612 0.650 0.813   Prior results that appear inconsistent are lined through    IMPRESSION:  BONE DENSITY IS LOW, CONSISTENT WITH OSTEOPOROSIS.  SINCE THE LAST DXA, BMD DID NOT CHANGE SIGNIFICANTLY IN THE SPINE OR LEFT HIP. COMPARED WITH 2018, BEFORE STARTING PROLIA, BMD IS HIGHER NOW IN THE SPINE, FEMORAL NECK, AND TOTAL HIP AND TRENDING UP IN THE TROCHANTER.     X-rays viewed: DXA printouts reviewed.  Labs.  01/2013 CBC CMP. 04/2013 UCa 181. 11/2017 Ca 9.8 Cr 0.6. 11/2018 Ca 10.0 Cr 0.6.  12/2019 Ca 9.4 Cr 0.6. 12/2020 Ca 9.4 Cr 0.7. 12/2021 Ca 9.1 Cr 0.6. 12/2022 Ca 9.6 Cr 0.7.  12/2023 Ca 9.3 Cr 0.6.    ASSESSMENT.  Osteoporosis, bone mineral density less than desirable, though stable [id number] with treatment with alendronate and again 04/2017-05/2018.  She is doing well with Prolia started 05/2018.     PLANS. OK to give Prolia today and continue Prolia 60 mg SQ twice yearly.   Return with DXA in one year. Time today: 30-39 minutes.    Pieter Mota MD, Director, [facility]    CC: Pham Cheney MD

## 2024-12-30 ENCOUNTER — HOSPITAL ENCOUNTER (OUTPATIENT)
Dept: VASCULAR LAB | Age: 74
Discharge: HOME | End: 2025-01-01
Attending: INTERNAL MEDICINE
Payer: MEDICARE

## 2024-12-30 DIAGNOSIS — I65.22 STENOSIS OF LEFT CAROTID ARTERY: ICD-10-CM

## 2024-12-30 PROCEDURE — 93880 EXTRACRANIAL BILAT STUDY: CPT

## 2025-01-02 LAB
VAS LEFT ARM BP: 144 MMHG
VAS LEFT CCA DIST EDV: 24.7 CM/S
VAS LEFT CCA DIST PSV: 80.7 CM/S
VAS LEFT CCA MID EDV: 22.4 CM/S
VAS LEFT CCA MID PSV: 85.1 CM/S
VAS LEFT CCA PROX EDV: 24.9 CM/S
VAS LEFT CCA PROX PSV: 90.7 CM/S
VAS LEFT ECA EDV: 14.7 CM/S
VAS LEFT ECA PSV: 69.3 CM/S
VAS LEFT ICA DIST EDV: 24.1 CM/S
VAS LEFT ICA DIST PSV: 71.8 CM/S
VAS LEFT ICA MID EDV: 26.1 CM/S
VAS LEFT ICA MID PSV: 81.4 CM/S
VAS LEFT ICA PROX EDV: 27.5 CM/S
VAS LEFT ICA PROX PSV: 72.7 CM/S
VAS LEFT ICA/CCA PSV: 0.85
VAS LEFT SUBCLAVIAN PROX EDV: 0 CM/S
VAS LEFT SUBCLAVIAN PROX PSV: 133 CM/S
VAS LEFT VERTEBRAL EDV: 20.8 CM/S
VAS LEFT VERTEBRAL PSV: 56.1 CM/S
VAS RIGHT ARM BP: 150 MMHG
VAS RIGHT CCA DIST EDV: 24.1 CM/S
VAS RIGHT CCA DIST PSV: 76.7 CM/S
VAS RIGHT CCA MID EDV: 19.7 CM/S
VAS RIGHT CCA MID PSV: 70.8 CM/S
VAS RIGHT CCA PROX EDV: 18.7 CM/S
VAS RIGHT CCA PROX PSV: 83.5 CM/S
VAS RIGHT ECA EDV: 17.4 CM/S
VAS RIGHT ECA PSV: 96.9 CM/S
VAS RIGHT ICA DIST EDV: 37.1 CM/S
VAS RIGHT ICA DIST PSV: 108 CM/S
VAS RIGHT ICA MID EDV: 28.7 CM/S
VAS RIGHT ICA MID PSV: 98.1 CM/S
VAS RIGHT ICA PROX EDV: 33.5 CM/S
VAS RIGHT ICA PROX PSV: 99.4 CM/S
VAS RIGHT ICA/CCA PSV: 1.4
VAS RIGHT SUBCLAVIAN PROX EDV: 0 CM/S
VAS RIGHT SUBCLAVIAN PROX PSV: 102 CM/S
VAS RIGHT VERTEBRAL EDV: 16.2 CM/S
VAS RIGHT VERTEBRAL PSV: 47.9 CM/S

## 2025-01-02 PROCEDURE — 93880 EXTRACRANIAL BILAT STUDY: CPT | Performed by: SURGERY

## 2025-03-24 ENCOUNTER — TELEPHONE (OUTPATIENT)
Dept: ENDOCRINOLOGY | Age: 75
End: 2025-03-24

## 2025-04-03 ENCOUNTER — CLINICAL SUPPORT (OUTPATIENT)
Dept: ENDOCRINOLOGY | Age: 75
End: 2025-04-03
Payer: MEDICARE

## 2025-04-03 DIAGNOSIS — M81.0 OSTEOPOROSIS, POSTMENOPAUSAL: Primary | ICD-10-CM

## 2025-04-03 PROCEDURE — 96372 THER/PROPH/DIAG INJ SC/IM: CPT | Performed by: INTERNAL MEDICINE

## 2025-08-18 ENCOUNTER — TELEPHONE (OUTPATIENT)
Dept: INTERNAL MEDICINE CLINIC | Age: 75
End: 2025-08-18

## (undated) DEVICE — CANNULA SAMP CO2 AD GRN 7FT CO2 AND 7FT O2 TBNG UNIV CONN